# Patient Record
Sex: FEMALE | URBAN - METROPOLITAN AREA
[De-identification: names, ages, dates, MRNs, and addresses within clinical notes are randomized per-mention and may not be internally consistent; named-entity substitution may affect disease eponyms.]

---

## 2022-09-01 ENCOUNTER — APPOINTMENT (RX ONLY)
Dept: URBAN - METROPOLITAN AREA CLINIC 40 | Facility: CLINIC | Age: 27
Setting detail: DERMATOLOGY
End: 2022-09-01

## 2022-09-01 DIAGNOSIS — L28.0 LICHEN SIMPLEX CHRONICUS: ICD-10-CM | Status: WORSENING

## 2022-09-01 PROCEDURE — ? PHOTO-DOCUMENTATION

## 2022-09-01 PROCEDURE — 99204 OFFICE O/P NEW MOD 45 MIN: CPT

## 2022-09-01 PROCEDURE — ? COUNSELING

## 2022-09-01 PROCEDURE — ? ADDITIONAL NOTES

## 2022-09-01 PROCEDURE — ? DIAGNOSIS COMMENT

## 2022-09-01 PROCEDURE — ? PRESCRIPTION MEDICATION MANAGEMENT

## 2022-09-01 PROCEDURE — ? PRESCRIPTION

## 2022-09-01 RX ORDER — PIMECROLIMUS 10 MG/G
CREAM TOPICAL
Qty: 60 | Refills: 1 | Status: ERX | COMMUNITY
Start: 2022-09-01

## 2022-09-01 RX ADMIN — PIMECROLIMUS: 10 CREAM TOPICAL at 00:00

## 2022-09-01 ASSESSMENT — LOCATION SIMPLE DESCRIPTION DERM: LOCATION SIMPLE: RIGHT KNEE

## 2022-09-01 ASSESSMENT — LOCATION ZONE DERM: LOCATION ZONE: LEG

## 2022-09-01 ASSESSMENT — LOCATION DETAILED DESCRIPTION DERM: LOCATION DETAILED: RIGHT KNEE

## 2022-09-01 NOTE — HPI: RASH
What Type Of Note Output Would You Prefer (Optional)?: Bullet Format
How Severe Is Your Rash?: mild
Is This A New Presentation, Or A Follow-Up?: Rash
Additional History: Pt states she saw pcp and was given a cream, states she doesn’t know name of cream

## 2022-09-01 NOTE — PROCEDURE: DIAGNOSIS COMMENT
Render Risk Assessment In Note?: no
Detail Level: Simple
Comment: *Prior clobetasol but rebounds upon d/c.  Consider ilk injections if unimproved. Elidel bid x 6w. F/u 6-8 weeks.  Try not to scratch.

## 2022-09-01 NOTE — PROCEDURE: PRESCRIPTION MEDICATION MANAGEMENT
Initiate Treatment: - pimecrolimus cream: Apply twice daily to affected areas on the right knee for 6 weeks
Render In Strict Bullet Format?: Yes
Detail Level: Zone

## 2022-12-02 ENCOUNTER — RX ONLY (OUTPATIENT)
Age: 27
Setting detail: RX ONLY
End: 2022-12-02

## 2022-12-02 ENCOUNTER — APPOINTMENT (RX ONLY)
Dept: URBAN - METROPOLITAN AREA CLINIC 40 | Facility: CLINIC | Age: 27
Setting detail: DERMATOLOGY
End: 2022-12-02

## 2022-12-02 DIAGNOSIS — L28.0 LICHEN SIMPLEX CHRONICUS: ICD-10-CM

## 2022-12-02 PROCEDURE — 11900 INJECT SKIN LESIONS </W 7: CPT

## 2022-12-02 PROCEDURE — ? PHOTO-DOCUMENTATION

## 2022-12-02 PROCEDURE — ? INTRALESIONAL KENALOG

## 2022-12-02 PROCEDURE — ? COUNSELING

## 2022-12-02 PROCEDURE — ? PRESCRIPTION MEDICATION MANAGEMENT

## 2022-12-02 RX ORDER — CLOBETASOL PROPIONATE 0.5 MG/G
CREAM TOPICAL
Qty: 60 | Refills: 1 | Status: ERX | COMMUNITY
Start: 2022-12-02

## 2022-12-02 ASSESSMENT — LOCATION ZONE DERM: LOCATION ZONE: LEG

## 2022-12-02 ASSESSMENT — LOCATION DETAILED DESCRIPTION DERM: LOCATION DETAILED: RIGHT KNEE

## 2022-12-02 ASSESSMENT — LOCATION SIMPLE DESCRIPTION DERM: LOCATION SIMPLE: RIGHT KNEE

## 2022-12-02 NOTE — PROCEDURE: INTRALESIONAL KENALOG
How Many Mls Were Removed From The 40 Mg/Ml (5ml) Vial When Preparing The Injectable Solution?: 0
Consent: The risks of atrophy were reviewed with the patient.
Include Z78.9 (Other Specified Conditions Influencing Health Status) As An Associated Diagnosis?: No
Kenalog Preparation: Kenalog
Concentration Of Solution Injected (Mg/Ml): 2.5
Total Volume Injected (Ccs- Only Use Numbers And Decimals): .4
Validate Note Data When Using Inventory: Yes
Detail Level: Detailed
Medical Necessity Clause: This procedure was medically necessary because the lesions that were treated were:
Treatment Number (Optional): 1
Administered By (Optional): LV

## 2022-12-02 NOTE — PROCEDURE: PRESCRIPTION MEDICATION MANAGEMENT
Telephone Encounter by Jaye Lara Dmitry [employer]cc[employer]Get [employer] It Sales Katherin at 02/13/18 10:39 AM     Author:  Jaye Lara Dmitry Patton Hatch Katherin Service:  (none) Author Type:  Certified Medical Assistant     Filed:  02/13/18 10:41 AM Encounter Date:  2/13/2018 Status:  Signed     : Jaye Lara Dmitry [employer]cc[employer] Sales Katherin (Certified Medical Assistant)            Routed to Dr Pricilla Quick   Please advise if we are able to book at a 10:40 slot the week of 03/05/18.   Next regular available work in appointment is 04/13/18[DW1.1M]  Electronically Signed by:    Jaye Lara CMA , 2/13/2018[DW1.2T]        Revision History        User Key Date/Time User Provider Type Action    > DW1.2 02/13/18 10:41 AM Jaye Lara Dmitry [employer] Certified Medical Assistant Sign     DW1.1 02/13/18 10:39 AM Jaye Lara Dmitry [employer] Certified Medical Assistant     M - Manual, T - Template
Render In Strict Bullet Format?: No
Detail Level: Zone
Initiate Treatment: - Clobetasol .05% cream bid x 2 weeks then break x 2 weeks and restart as needed.